# Patient Record
Sex: FEMALE | Race: WHITE | NOT HISPANIC OR LATINO | URBAN - METROPOLITAN AREA
[De-identification: names, ages, dates, MRNs, and addresses within clinical notes are randomized per-mention and may not be internally consistent; named-entity substitution may affect disease eponyms.]

---

## 2020-06-24 ENCOUNTER — OUT OF OFFICE VISIT (OUTPATIENT)
Dept: URBAN - METROPOLITAN AREA MEDICAL CENTER 1 | Facility: MEDICAL CENTER | Age: 60
End: 2020-06-24
Payer: MEDICARE

## 2020-06-24 DIAGNOSIS — K92.1 BLACK STOOL: ICD-10-CM

## 2020-06-24 DIAGNOSIS — U07.1 COVID-19: ICD-10-CM

## 2020-06-24 DIAGNOSIS — K74.69 CIRRHOSIS, CRYPTOGENIC: ICD-10-CM

## 2020-06-24 DIAGNOSIS — R93.2 ABNORMAL CHOLANGIOGRAM: ICD-10-CM

## 2020-06-24 DIAGNOSIS — D62 ACUTE BLOOD LOSS ANEMIA: ICD-10-CM

## 2020-06-24 PROCEDURE — 99231 SBSQ HOSP IP/OBS SF/LOW 25: CPT | Performed by: NURSE PRACTITIONER

## 2020-06-24 PROCEDURE — G8427 DOCREV CUR MEDS BY ELIG CLIN: HCPCS | Performed by: NURSE PRACTITIONER

## 2020-06-24 PROCEDURE — 99221 1ST HOSP IP/OBS SF/LOW 40: CPT | Performed by: NURSE PRACTITIONER

## 2021-02-18 ENCOUNTER — OUT OF OFFICE VISIT (OUTPATIENT)
Dept: URBAN - METROPOLITAN AREA MEDICAL CENTER 1 | Facility: MEDICAL CENTER | Age: 61
End: 2021-02-18
Payer: MEDICARE

## 2021-02-18 DIAGNOSIS — K74.60 ADVANCED CIRRHOSIS OF LIVER: ICD-10-CM

## 2021-02-18 DIAGNOSIS — D64.89 ANEMIA DUE TO OTHER CAUSE: ICD-10-CM

## 2021-02-18 DIAGNOSIS — K92.1 BLACK STOOL: ICD-10-CM

## 2021-02-18 DIAGNOSIS — N18.9 CKD (CHRONIC KIDNEY DISEASE): ICD-10-CM

## 2021-02-18 PROCEDURE — 99222 1ST HOSP IP/OBS MODERATE 55: CPT | Performed by: INTERNAL MEDICINE

## 2021-02-18 PROCEDURE — G8427 DOCREV CUR MEDS BY ELIG CLIN: HCPCS | Performed by: INTERNAL MEDICINE

## 2021-02-19 ENCOUNTER — OUT OF OFFICE VISIT (OUTPATIENT)
Dept: URBAN - METROPOLITAN AREA MEDICAL CENTER 1 | Facility: MEDICAL CENTER | Age: 61
End: 2021-02-19
Payer: MEDICARE

## 2021-02-19 DIAGNOSIS — K92.1 BLACK STOOL: ICD-10-CM

## 2021-02-19 DIAGNOSIS — K31.89 ACQUIRED DEFORMITY OF DUODENUM: ICD-10-CM

## 2021-02-19 DIAGNOSIS — K29.60 ADENOPAPILLOMATOSIS GASTRICA: ICD-10-CM

## 2021-02-19 PROCEDURE — 43239 EGD BIOPSY SINGLE/MULTIPLE: CPT | Performed by: INTERNAL MEDICINE

## 2021-02-20 ENCOUNTER — OUT OF OFFICE VISIT (OUTPATIENT)
Dept: URBAN - METROPOLITAN AREA MEDICAL CENTER 1 | Facility: MEDICAL CENTER | Age: 61
End: 2021-02-20
Payer: MEDICARE

## 2021-02-20 DIAGNOSIS — D64.89 ANEMIA DUE TO OTHER CAUSE: ICD-10-CM

## 2021-02-20 DIAGNOSIS — N18.9 ANEMIA DUE TO CHRONIC KIDNEY DISEASE: ICD-10-CM

## 2021-02-20 DIAGNOSIS — K92.1 BLACK STOOL: ICD-10-CM

## 2021-02-20 DIAGNOSIS — K74.60 ADVANCED CIRRHOSIS OF LIVER: ICD-10-CM

## 2021-02-20 PROCEDURE — 99232 SBSQ HOSP IP/OBS MODERATE 35: CPT | Performed by: INTERNAL MEDICINE

## 2021-03-01 ENCOUNTER — OUT OF OFFICE VISIT (OUTPATIENT)
Dept: URBAN - METROPOLITAN AREA MEDICAL CENTER 1 | Facility: MEDICAL CENTER | Age: 61
End: 2021-03-01
Payer: MEDICARE

## 2021-03-01 DIAGNOSIS — R10.84 ABDOMINAL CRAMPING, GENERALIZED: ICD-10-CM

## 2021-03-01 DIAGNOSIS — K74.60 ADVANCED CIRRHOSIS OF LIVER: ICD-10-CM

## 2021-03-01 DIAGNOSIS — R18.8 ABDOMINAL FLUID COLLECTION: ICD-10-CM

## 2021-03-01 DIAGNOSIS — K72.90 ATROPHY OF LIVER: ICD-10-CM

## 2021-03-01 PROCEDURE — 99222 1ST HOSP IP/OBS MODERATE 55: CPT | Performed by: INTERNAL MEDICINE

## 2021-03-01 PROCEDURE — G8427 DOCREV CUR MEDS BY ELIG CLIN: HCPCS | Performed by: INTERNAL MEDICINE

## 2021-03-17 ENCOUNTER — OUT OF OFFICE VISIT (OUTPATIENT)
Dept: URBAN - METROPOLITAN AREA MEDICAL CENTER 1 | Facility: MEDICAL CENTER | Age: 61
End: 2021-03-17
Payer: MEDICARE

## 2021-03-17 DIAGNOSIS — K92.1 BLACK STOOL: ICD-10-CM

## 2021-03-17 DIAGNOSIS — K72.90 ATROPHY OF LIVER: ICD-10-CM

## 2021-03-17 DIAGNOSIS — K74.60 ADVANCED CIRRHOSIS OF LIVER: ICD-10-CM

## 2021-03-17 DIAGNOSIS — K92.0 BLOODY EMESIS: ICD-10-CM

## 2021-03-17 DIAGNOSIS — R18.8 ABDOMINAL FLUID COLLECTION: ICD-10-CM

## 2021-03-17 PROCEDURE — 99222 1ST HOSP IP/OBS MODERATE 55: CPT | Performed by: INTERNAL MEDICINE

## 2021-03-17 PROCEDURE — G8427 DOCREV CUR MEDS BY ELIG CLIN: HCPCS | Performed by: INTERNAL MEDICINE

## 2021-03-17 PROCEDURE — 99232 SBSQ HOSP IP/OBS MODERATE 35: CPT | Performed by: INTERNAL MEDICINE

## 2021-03-19 ENCOUNTER — OUT OF OFFICE VISIT (OUTPATIENT)
Dept: URBAN - METROPOLITAN AREA MEDICAL CENTER 1 | Facility: MEDICAL CENTER | Age: 61
End: 2021-03-19
Payer: MEDICARE

## 2021-03-19 DIAGNOSIS — K72.90 ATROPHY OF LIVER: ICD-10-CM

## 2021-03-19 DIAGNOSIS — K74.60 ADVANCED CIRRHOSIS OF LIVER: ICD-10-CM

## 2021-03-19 PROCEDURE — 99232 SBSQ HOSP IP/OBS MODERATE 35: CPT | Performed by: INTERNAL MEDICINE

## 2021-03-23 ENCOUNTER — CLAIMS CREATED FROM THE CLAIM WINDOW (OUTPATIENT)
Dept: URBAN - NONMETROPOLITAN AREA CLINIC 2 | Facility: CLINIC | Age: 61
End: 2021-03-23
Payer: MEDICARE

## 2021-03-23 DIAGNOSIS — K72.90 HEPATIC ENCEPHALOPATHY: ICD-10-CM

## 2021-03-23 DIAGNOSIS — I85.10 SECONDARY ESOPHAGEAL VARICES WITHOUT BLEEDING: ICD-10-CM

## 2021-03-23 DIAGNOSIS — K75.81 NONALCOHOLIC STEATOHEPATITIS (NASH): ICD-10-CM

## 2021-03-23 DIAGNOSIS — R18.8 OTHER ASCITES: ICD-10-CM

## 2021-03-23 DIAGNOSIS — Z12.11 COLON CANCER SCREENING: ICD-10-CM

## 2021-03-23 DIAGNOSIS — D50.0 IRON DEFICIENCY ANEMIA DUE TO CHRONIC BLOOD LOSS: ICD-10-CM

## 2021-03-23 PROCEDURE — 99215 OFFICE O/P EST HI 40 MIN: CPT | Performed by: NURSE PRACTITIONER

## 2021-03-23 PROCEDURE — 99214 OFFICE O/P EST MOD 30 MIN: CPT | Performed by: NURSE PRACTITIONER

## 2021-03-23 RX ORDER — RIFAXIMIN 550 MG/1
1 TABLET TABLET ORAL TWICE A DAY
Qty: 180 TABLET | Refills: 2 | OUTPATIENT
Start: 2021-03-23 | End: 2021-12-17

## 2021-03-23 RX ORDER — OMEPRAZOLE 20 MG/1
CAPSULE, DELAYED RELEASE ORAL
Qty: 90 UNSPECIFIED | Status: ACTIVE | COMMUNITY

## 2021-03-23 RX ORDER — POTASSIUM CHLORIDE 750 MG/1
TABLET, EXTENDED RELEASE ORAL
Qty: 180 UNSPECIFIED | Status: ACTIVE | COMMUNITY

## 2021-03-23 RX ORDER — PREDNISONE 20 MG/1
TABLET ORAL
Qty: 10 UNSPECIFIED | Status: ACTIVE | COMMUNITY

## 2021-03-23 RX ORDER — METOLAZONE 5 MG/1
TABLET ORAL
Qty: 5 UNSPECIFIED | Status: ACTIVE | COMMUNITY

## 2021-03-23 RX ORDER — ALLOPURINOL 100 MG/1
TABLET ORAL
Qty: 30 UNSPECIFIED | Status: ACTIVE | COMMUNITY

## 2021-03-23 RX ORDER — LACTULOSE 10 G/15ML
SOLUTION ORAL
Qty: 1800 UNSPECIFIED | Status: ACTIVE | COMMUNITY

## 2021-03-23 RX ORDER — TRIAMCINOLONE ACETONIDE 1 MG/G
OINTMENT TOPICAL
Qty: 454 UNSPECIFIED | Status: ACTIVE | COMMUNITY

## 2021-03-23 RX ORDER — SERTRALINE HYDROCHLORIDE 50 MG/1
TABLET, FILM COATED ORAL
Qty: 90 UNSPECIFIED | Status: ACTIVE | COMMUNITY

## 2021-03-23 RX ORDER — METFORMIN HYDROCHLORIDE 500 MG/1
TABLET ORAL
Qty: 180 UNSPECIFIED | Status: ACTIVE | COMMUNITY

## 2021-03-23 RX ORDER — GABAPENTIN 100 MG/1
CAPSULE ORAL
Qty: 120 UNSPECIFIED | Status: ACTIVE | COMMUNITY

## 2021-03-23 RX ORDER — SPIRONOLACTONE 25 MG/1
TABLET ORAL
Qty: 180 UNSPECIFIED | Status: ACTIVE | COMMUNITY

## 2021-03-23 RX ORDER — AMLODIPINE BESYLATE 10 MG/1
TABLET ORAL
Qty: 90 UNSPECIFIED | Status: ACTIVE | COMMUNITY

## 2021-03-23 RX ORDER — OMEPRAZOLE 20 MG/1
1 CAPSULE 30 MINUTES BEFORE MORNING MEAL CAPSULE, DELAYED RELEASE ORAL BID
Qty: 180 CAPSULE | Refills: 3 | OUTPATIENT
Start: 2021-03-23

## 2021-03-23 RX ORDER — SUCRALFATE 1 G/1
TABLET ORAL
Qty: 90 UNSPECIFIED | Status: ACTIVE | COMMUNITY

## 2021-03-23 RX ORDER — TORSEMIDE 20 MG/1
TABLET ORAL
Qty: 360 UNSPECIFIED | Status: ACTIVE | COMMUNITY

## 2021-03-23 NOTE — HPI-TODAY'S VISIT:
Ashlyn presents for hospital follow-up of Campos cirrhosis with sequela of hepatic encephalopathy, portal hypertension, esophageal varices, and ascites.  Since her discharge yesterday she is feeling much better.  She has not gotten a prescription for Xifaxan, she is titrating her lactulose to 3-4 bowel movements daily.  She states that she remains on a low-sodium diet and is on torsemide 80 mg daily and spironolactone 100 mg daily following with Dr. Yoo.  She does see Dr. James, but she missed her appointment March 18 due to being admitted.  Her EGD earlier this year reveals oozing portal hypertensive gastropathy along with 3 columns of grade 1 esophageal varices.  Unfortunately her heart rate will not tolerate nonselective beta-blockers.  She is currently not on PPI therapy.  Today she is doing fairly well otherwise, she does plan to follow-up with Dr. James.  She states that she will need help in applying for patient assistance for the Xifaxan.  MB

## 2021-03-25 ENCOUNTER — TELEPHONE ENCOUNTER (OUTPATIENT)
Dept: URBAN - METROPOLITAN AREA CLINIC 23 | Facility: CLINIC | Age: 61
End: 2021-03-25

## 2021-04-01 ENCOUNTER — OFFICE VISIT (OUTPATIENT)
Dept: URBAN - NONMETROPOLITAN AREA CLINIC 2 | Facility: CLINIC | Age: 61
End: 2021-04-01

## 2021-04-29 ENCOUNTER — OUT OF OFFICE VISIT (OUTPATIENT)
Dept: URBAN - METROPOLITAN AREA MEDICAL CENTER 1 | Facility: MEDICAL CENTER | Age: 61
End: 2021-04-29
Payer: MEDICARE

## 2021-04-29 DIAGNOSIS — K74.69 CIRRHOSIS, CRYPTOGENIC: ICD-10-CM

## 2021-04-29 DIAGNOSIS — R18.8 ABDOMINAL FLUID COLLECTION: ICD-10-CM

## 2021-04-29 DIAGNOSIS — K72.90 ATROPHY OF LIVER: ICD-10-CM

## 2021-04-29 PROCEDURE — G8427 DOCREV CUR MEDS BY ELIG CLIN: HCPCS | Performed by: INTERNAL MEDICINE

## 2021-04-29 PROCEDURE — 99232 SBSQ HOSP IP/OBS MODERATE 35: CPT | Performed by: INTERNAL MEDICINE

## 2021-04-29 PROCEDURE — 99222 1ST HOSP IP/OBS MODERATE 55: CPT | Performed by: INTERNAL MEDICINE

## 2021-06-17 ENCOUNTER — LAB OUTSIDE AN ENCOUNTER (OUTPATIENT)
Dept: URBAN - NONMETROPOLITAN AREA CLINIC 2 | Facility: CLINIC | Age: 61
End: 2021-06-17

## 2021-06-17 ENCOUNTER — OFFICE VISIT (OUTPATIENT)
Dept: URBAN - NONMETROPOLITAN AREA CLINIC 2 | Facility: CLINIC | Age: 61
End: 2021-06-17
Payer: MEDICARE

## 2021-06-17 ENCOUNTER — WEB ENCOUNTER (OUTPATIENT)
Dept: URBAN - NONMETROPOLITAN AREA CLINIC 2 | Facility: CLINIC | Age: 61
End: 2021-06-17

## 2021-06-17 DIAGNOSIS — K72.90 HEPATIC ENCEPHALOPATHY: ICD-10-CM

## 2021-06-17 DIAGNOSIS — K75.81 NONALCOHOLIC STEATOHEPATITIS (NASH): ICD-10-CM

## 2021-06-17 DIAGNOSIS — Z12.11 COLON CANCER SCREENING: ICD-10-CM

## 2021-06-17 DIAGNOSIS — R18.8 OTHER ASCITES: ICD-10-CM

## 2021-06-17 DIAGNOSIS — D50.0 IRON DEFICIENCY ANEMIA DUE TO CHRONIC BLOOD LOSS: ICD-10-CM

## 2021-06-17 DIAGNOSIS — I85.10 SECONDARY ESOPHAGEAL VARICES WITHOUT BLEEDING: ICD-10-CM

## 2021-06-17 PROCEDURE — 99214 OFFICE O/P EST MOD 30 MIN: CPT | Performed by: INTERNAL MEDICINE

## 2021-06-17 RX ORDER — TORSEMIDE 20 MG/1
TABLET ORAL
Qty: 360 UNSPECIFIED | Status: ACTIVE | COMMUNITY

## 2021-06-17 RX ORDER — METFORMIN HYDROCHLORIDE 500 MG/1
TABLET ORAL
Qty: 180 UNSPECIFIED | Status: ACTIVE | COMMUNITY

## 2021-06-17 RX ORDER — PREDNISONE 20 MG/1
TABLET ORAL
Qty: 10 UNSPECIFIED | Status: ACTIVE | COMMUNITY

## 2021-06-17 RX ORDER — TRIAMCINOLONE ACETONIDE 1 MG/G
OINTMENT TOPICAL
Qty: 454 UNSPECIFIED | Status: ACTIVE | COMMUNITY

## 2021-06-17 RX ORDER — AMLODIPINE BESYLATE 10 MG/1
TABLET ORAL
Qty: 90 UNSPECIFIED | Status: ACTIVE | COMMUNITY

## 2021-06-17 RX ORDER — ALLOPURINOL 100 MG/1
TABLET ORAL
Qty: 30 UNSPECIFIED | Status: ACTIVE | COMMUNITY

## 2021-06-17 RX ORDER — SERTRALINE HYDROCHLORIDE 50 MG/1
TABLET, FILM COATED ORAL
Qty: 90 UNSPECIFIED | Status: ACTIVE | COMMUNITY

## 2021-06-17 RX ORDER — METOLAZONE 5 MG/1
TABLET ORAL
Qty: 5 UNSPECIFIED | Status: ACTIVE | COMMUNITY

## 2021-06-17 RX ORDER — LACTULOSE 10 G/15ML
SOLUTION ORAL
Qty: 1800 UNSPECIFIED | Status: ACTIVE | COMMUNITY

## 2021-06-17 RX ORDER — GABAPENTIN 100 MG/1
CAPSULE ORAL
Qty: 120 UNSPECIFIED | Status: ACTIVE | COMMUNITY

## 2021-06-17 RX ORDER — POTASSIUM CHLORIDE 750 MG/1
TABLET, EXTENDED RELEASE ORAL
Qty: 180 UNSPECIFIED | Status: ACTIVE | COMMUNITY

## 2021-06-17 RX ORDER — SUCRALFATE 1 G/1
TABLET ORAL
Qty: 90 UNSPECIFIED | Status: ACTIVE | COMMUNITY

## 2021-06-17 RX ORDER — OMEPRAZOLE 20 MG/1
1 CAPSULE 30 MINUTES BEFORE MORNING MEAL CAPSULE, DELAYED RELEASE ORAL BID
Qty: 180 CAPSULE | Refills: 3 | Status: ACTIVE | COMMUNITY
Start: 2021-03-23

## 2021-06-17 RX ORDER — OMEPRAZOLE 20 MG/1
CAPSULE, DELAYED RELEASE ORAL
Qty: 90 UNSPECIFIED | Status: ACTIVE | COMMUNITY

## 2021-06-17 RX ORDER — RIFAXIMIN 550 MG/1
1 TABLET TABLET ORAL TWICE A DAY
Qty: 180 TABLET | Refills: 2 | Status: ACTIVE | COMMUNITY
Start: 2021-03-23 | End: 2021-12-17

## 2021-06-17 RX ORDER — POLYETHYLENE GLYCOL 3350, SODIUM SULFATE ANHYDROUS, SODIUM BICARBONATE, SODIUM CHLORIDE, POTASSIUM CHLORIDE 236; 22.74; 6.74; 5.86; 2.97 G/4L; G/4L; G/4L; G/4L; G/4L
AS DIRECTED POWDER, FOR SOLUTION ORAL ONCE
Qty: 1 GALLON | Refills: 0 | OUTPATIENT
Start: 2021-06-17 | End: 2021-06-18

## 2021-06-17 RX ORDER — SPIRONOLACTONE 25 MG/1
TABLET ORAL
Qty: 180 UNSPECIFIED | Status: ACTIVE | COMMUNITY

## 2021-06-17 NOTE — HPI-TODAY'S VISIT:
Glenis presents for hospital follow-up of Campos cirrhosis with sequela of hepatic encephalopathy, portal hypertension, esophageal varices, and ascites.  Since her discharge yesterday she is feeling much better.  She has not gotten a prescription for Xifaxan, she is titrating her lactulose to 3-4 bowel movements daily.  She states that she remains on a low-sodium diet and is on torsemide 80 mg daily and spironolactone 100 mg daily following with Dr. Yoo.  She does see Dr. James, but she missed her appointment March 18 due to being admitted.  Her EGD earlier this year reveals oozing portal hypertensive gastropathy along with 3 columns of grade 1 esophageal varices.  Unfortunately her heart rate will not tolerate nonselective beta-blockers.  She is currently not on PPI therapy.  Today she is doing fairly well otherwise, she does plan to follow-up with Dr. James.  She states that she will need help in applying for patient assistance for the Xifaxan.  MB  6/17/2021 Glenis presents for [facility] follow-up.  In early May she was admitted with altered mental status and sepsis secondary to MRSA.  Infectious disease was consulted and she was treated.  She is on Xifaxan and lactulose with management of her Hg.  Today she has her symptoms managed.  She continues to have mild reaccumulation, she is following with nephrology on diuretics, she does not feel distended or need of a paracentesis today.  Her primary issue today is her new onset iron deficiency anemia.  During her admission her hemoglobin dropped to 7.  She did have a positive Hemoccult.  Her last EGD was in February 2021 with 3 columns of grade 1 esophageal varices and oozing portal hypertension.  She is not on a PPI per her list.  She is also not on a beta-blocker, her heart rate today is 69, her blood pressure is in the 120s over 70s.  Her last colonoscopy was done by Dr. Madrigal in the past 5 years, we have not been able to get these records.  Today she is here for evaluation of iron deficiency anemia, she is not seeing hematology.  MB

## 2021-07-28 ENCOUNTER — OUT OF OFFICE VISIT (OUTPATIENT)
Dept: URBAN - METROPOLITAN AREA MEDICAL CENTER 1 | Facility: MEDICAL CENTER | Age: 61
End: 2021-07-28
Payer: MEDICARE

## 2021-07-28 DIAGNOSIS — K92.1 BLACK STOOL: ICD-10-CM

## 2021-07-28 DIAGNOSIS — K72.90 ATROPHY OF LIVER: ICD-10-CM

## 2021-07-28 DIAGNOSIS — D64.89 ANEMIA DUE TO OTHER CAUSE: ICD-10-CM

## 2021-07-28 DIAGNOSIS — K74.60 ADVANCED CIRRHOSIS OF LIVER: ICD-10-CM

## 2021-07-28 PROCEDURE — 99222 1ST HOSP IP/OBS MODERATE 55: CPT | Performed by: INTERNAL MEDICINE

## 2021-07-28 PROCEDURE — G8427 DOCREV CUR MEDS BY ELIG CLIN: HCPCS | Performed by: INTERNAL MEDICINE

## 2021-07-29 ENCOUNTER — OUT OF OFFICE VISIT (OUTPATIENT)
Dept: URBAN - METROPOLITAN AREA MEDICAL CENTER 1 | Facility: MEDICAL CENTER | Age: 61
End: 2021-07-29
Payer: MEDICARE

## 2021-07-29 ENCOUNTER — TELEPHONE ENCOUNTER (OUTPATIENT)
Dept: URBAN - METROPOLITAN AREA CLINIC 92 | Facility: CLINIC | Age: 61
End: 2021-07-29

## 2021-07-29 ENCOUNTER — TELEPHONE ENCOUNTER (OUTPATIENT)
Dept: URBAN - NONMETROPOLITAN AREA CLINIC 2 | Facility: CLINIC | Age: 61
End: 2021-07-29

## 2021-07-29 DIAGNOSIS — K92.1 BLACK STOOL: ICD-10-CM

## 2021-07-29 DIAGNOSIS — K31.89 ACQUIRED DEFORMITY OF DUODENUM: ICD-10-CM

## 2021-07-29 PROCEDURE — 43235 EGD DIAGNOSTIC BRUSH WASH: CPT | Performed by: INTERNAL MEDICINE

## 2021-08-05 ENCOUNTER — OFFICE VISIT (OUTPATIENT)
Dept: URBAN - METROPOLITAN AREA MEDICAL CENTER 1 | Facility: MEDICAL CENTER | Age: 61
End: 2021-08-05
Payer: MEDICARE

## 2021-08-05 DIAGNOSIS — Z12.11 COLON CANCER SCREENING: ICD-10-CM

## 2021-08-05 PROCEDURE — 992 NON-BILLABLE: Performed by: INTERNAL MEDICINE

## 2021-08-30 ENCOUNTER — ERX REFILL RESPONSE (OUTPATIENT)
Dept: URBAN - NONMETROPOLITAN AREA CLINIC 2 | Facility: CLINIC | Age: 61
End: 2021-08-30

## 2021-08-30 RX ORDER — RIFAXIMIN 550 MG/1
1 TABLET TABLET ORAL TWICE A DAY
Qty: 180 TABLET | Refills: 2 | OUTPATIENT

## 2021-08-30 RX ORDER — RIFAXIMIN 550 MG/1
1 TABLET TABLET ORAL TWICE A DAY
Qty: 180 TABLET | Refills: 4 | OUTPATIENT

## 2021-09-16 ENCOUNTER — OFFICE VISIT (OUTPATIENT)
Dept: URBAN - NONMETROPOLITAN AREA CLINIC 2 | Facility: CLINIC | Age: 61
End: 2021-09-16

## 2021-09-23 ENCOUNTER — OFFICE VISIT (OUTPATIENT)
Dept: URBAN - NONMETROPOLITAN AREA CLINIC 2 | Facility: CLINIC | Age: 61
End: 2021-09-23
Payer: MEDICARE

## 2021-09-23 ENCOUNTER — TELEPHONE ENCOUNTER (OUTPATIENT)
Dept: URBAN - NONMETROPOLITAN AREA CLINIC 2 | Facility: CLINIC | Age: 61
End: 2021-09-23

## 2021-09-23 ENCOUNTER — LAB OUTSIDE AN ENCOUNTER (OUTPATIENT)
Dept: URBAN - NONMETROPOLITAN AREA CLINIC 2 | Facility: CLINIC | Age: 61
End: 2021-09-23

## 2021-09-23 VITALS
BODY MASS INDEX: 39.84 KG/M2 | WEIGHT: 269 LBS | HEIGHT: 69 IN | SYSTOLIC BLOOD PRESSURE: 125 MMHG | DIASTOLIC BLOOD PRESSURE: 74 MMHG | TEMPERATURE: 97.5 F | HEART RATE: 53 BPM

## 2021-09-23 DIAGNOSIS — K72.90 HEPATIC ENCEPHALOPATHY: ICD-10-CM

## 2021-09-23 DIAGNOSIS — R18.8 OTHER ASCITES: ICD-10-CM

## 2021-09-23 DIAGNOSIS — I85.10 SECONDARY ESOPHAGEAL VARICES WITHOUT BLEEDING: ICD-10-CM

## 2021-09-23 DIAGNOSIS — K75.81 NONALCOHOLIC STEATOHEPATITIS (NASH): ICD-10-CM

## 2021-09-23 DIAGNOSIS — Z12.11 COLON CANCER SCREENING: ICD-10-CM

## 2021-09-23 DIAGNOSIS — D50.0 IRON DEFICIENCY ANEMIA DUE TO CHRONIC BLOOD LOSS: ICD-10-CM

## 2021-09-23 PROCEDURE — 99214 OFFICE O/P EST MOD 30 MIN: CPT | Performed by: NURSE PRACTITIONER

## 2021-09-23 RX ORDER — LACTULOSE 10 G/15ML
SOLUTION ORAL
Qty: 1800 UNSPECIFIED | Status: ACTIVE | COMMUNITY

## 2021-09-23 RX ORDER — PREDNISONE 20 MG/1
TABLET ORAL
Qty: 10 UNSPECIFIED | Status: ACTIVE | COMMUNITY

## 2021-09-23 RX ORDER — SPIRONOLACTONE 25 MG/1
TABLET ORAL
Qty: 180 UNSPECIFIED | Status: ACTIVE | COMMUNITY

## 2021-09-23 RX ORDER — OMEPRAZOLE 20 MG/1
1 CAPSULE 30 MINUTES BEFORE MORNING MEAL CAPSULE, DELAYED RELEASE ORAL BID
Qty: 180 CAPSULE | Refills: 3 | Status: ACTIVE | COMMUNITY
Start: 2021-03-23

## 2021-09-23 RX ORDER — RIFAXIMIN 550 MG/1
1 TABLET TABLET ORAL TWICE A DAY
Qty: 180 TABLET | Refills: 4 | Status: ACTIVE | COMMUNITY

## 2021-09-23 RX ORDER — OMEPRAZOLE 20 MG/1
CAPSULE, DELAYED RELEASE ORAL
Qty: 90 UNSPECIFIED | Status: ACTIVE | COMMUNITY

## 2021-09-23 RX ORDER — TORSEMIDE 20 MG/1
TABLET ORAL
Qty: 360 UNSPECIFIED | Status: ACTIVE | COMMUNITY

## 2021-09-23 RX ORDER — AMLODIPINE BESYLATE 10 MG/1
TABLET ORAL
Qty: 90 UNSPECIFIED | Status: ACTIVE | COMMUNITY

## 2021-09-23 RX ORDER — ALLOPURINOL 100 MG/1
TABLET ORAL
Qty: 30 UNSPECIFIED | Status: ACTIVE | COMMUNITY

## 2021-09-23 RX ORDER — POTASSIUM CHLORIDE 750 MG/1
TABLET, EXTENDED RELEASE ORAL
Qty: 180 UNSPECIFIED | Status: ACTIVE | COMMUNITY

## 2021-09-23 RX ORDER — SERTRALINE HYDROCHLORIDE 50 MG/1
TABLET, FILM COATED ORAL
Qty: 90 UNSPECIFIED | Status: ACTIVE | COMMUNITY

## 2021-09-23 RX ORDER — GABAPENTIN 100 MG/1
CAPSULE ORAL
Qty: 120 UNSPECIFIED | Status: ACTIVE | COMMUNITY

## 2021-09-23 RX ORDER — METFORMIN HYDROCHLORIDE 500 MG/1
TABLET ORAL
Qty: 180 UNSPECIFIED | Status: ACTIVE | COMMUNITY

## 2021-09-23 RX ORDER — LACTULOSE 10 G/15ML
15 ML SOLUTION ORAL TID
Qty: 1350 ML | Refills: 11

## 2021-09-23 RX ORDER — TRIAMCINOLONE ACETONIDE 1 MG/G
OINTMENT TOPICAL
Qty: 454 UNSPECIFIED | Status: ACTIVE | COMMUNITY

## 2021-09-23 RX ORDER — SUCRALFATE 1 G/1
TABLET ORAL
Qty: 90 UNSPECIFIED | Status: ACTIVE | COMMUNITY

## 2021-09-23 RX ORDER — METOLAZONE 5 MG/1
TABLET ORAL
Qty: 5 UNSPECIFIED | Status: ACTIVE | COMMUNITY

## 2021-09-24 LAB
A/G RATIO: 1.1
AFP, SERUM, TUMOR MARKER: 2
ALBUMIN: 3.7
ALKALINE PHOSPHATASE: 130
ALT (SGPT): 28
AST (SGOT): 49
BASO (ABSOLUTE): 0
BASOS: 1
BILIRUBIN, TOTAL: 0.5
BUN/CREATININE RATIO: 27
BUN: 54
CALCIUM: 8.8
CARBON DIOXIDE, TOTAL: 21
CHLORIDE: 103
CREATININE: 2.01
EGFR IF AFRICN AM: 30
EGFR IF NONAFRICN AM: 26
EOS (ABSOLUTE): 0.1
EOS: 2
GLOBULIN, TOTAL: 3.5
GLUCOSE: 97
HEMATOCRIT: 25.7
HEMATOLOGY COMMENTS:: (no result)
HEMOGLOBIN: 8.2
IMMATURE CELLS: (no result)
IMMATURE GRANS (ABS): 0
IMMATURE GRANULOCYTES: 1
INR: 1.1
LYMPHS (ABSOLUTE): 0.5
LYMPHS: 11
MCH: 29.5
MCHC: 31.9
MCV: 92
MONOCYTES(ABSOLUTE): 0.3
MONOCYTES: 7
NEUTROPHILS (ABSOLUTE): 3.5
NEUTROPHILS: 78
NRBC: (no result)
PLATELETS: 70
POTASSIUM: 4.2
PROTEIN, TOTAL: 7.2
PROTHROMBIN TIME: 11.6
RBC: 2.78
RDW: 13.6
SODIUM: 140
WBC: 4.4

## 2021-09-28 ENCOUNTER — TELEPHONE ENCOUNTER (OUTPATIENT)
Dept: URBAN - METROPOLITAN AREA CLINIC 92 | Facility: CLINIC | Age: 61
End: 2021-09-28

## 2021-09-29 ENCOUNTER — LAB OUTSIDE AN ENCOUNTER (OUTPATIENT)
Dept: URBAN - METROPOLITAN AREA CLINIC 92 | Facility: CLINIC | Age: 61
End: 2021-09-29

## 2021-11-03 ENCOUNTER — TELEPHONE ENCOUNTER (OUTPATIENT)
Dept: URBAN - METROPOLITAN AREA SURGERY CENTER 30 | Facility: SURGERY CENTER | Age: 61
End: 2021-11-03

## 2021-11-03 RX ORDER — LACTULOSE 10 G/15ML
45 ML SOLUTION ORAL TID
Qty: 4050 ML | Refills: 11

## 2021-11-04 ENCOUNTER — OFFICE VISIT (OUTPATIENT)
Dept: URBAN - METROPOLITAN AREA MEDICAL CENTER 1 | Facility: MEDICAL CENTER | Age: 61
End: 2021-11-04

## 2021-12-16 ENCOUNTER — OFFICE VISIT (OUTPATIENT)
Dept: URBAN - METROPOLITAN AREA MEDICAL CENTER 1 | Facility: MEDICAL CENTER | Age: 61
End: 2021-12-16
Payer: MEDICARE

## 2021-12-16 ENCOUNTER — OFFICE VISIT (OUTPATIENT)
Dept: URBAN - NONMETROPOLITAN AREA CLINIC 2 | Facility: CLINIC | Age: 61
End: 2021-12-16

## 2021-12-16 DIAGNOSIS — D50.9 ANEMIA: ICD-10-CM

## 2021-12-16 DIAGNOSIS — D12.2 ADENOMA OF ASCENDING COLON: ICD-10-CM

## 2021-12-16 PROCEDURE — 45385 COLONOSCOPY W/LESION REMOVAL: CPT | Performed by: INTERNAL MEDICINE

## 2021-12-30 ENCOUNTER — OUT OF OFFICE VISIT (OUTPATIENT)
Dept: URBAN - METROPOLITAN AREA MEDICAL CENTER 1 | Facility: MEDICAL CENTER | Age: 61
End: 2021-12-30
Payer: MEDICARE

## 2021-12-30 DIAGNOSIS — D64.89 ANEMIA DUE TO OTHER CAUSE: ICD-10-CM

## 2021-12-30 DIAGNOSIS — K74.69 CIRRHOSIS, CRYPTOGENIC: ICD-10-CM

## 2021-12-30 DIAGNOSIS — K92.1 ACUTE MELENA: ICD-10-CM

## 2021-12-30 PROCEDURE — G8427 DOCREV CUR MEDS BY ELIG CLIN: HCPCS | Performed by: INTERNAL MEDICINE

## 2021-12-30 PROCEDURE — 99222 1ST HOSP IP/OBS MODERATE 55: CPT | Performed by: INTERNAL MEDICINE

## 2022-01-11 ENCOUNTER — OFFICE VISIT (OUTPATIENT)
Dept: URBAN - NONMETROPOLITAN AREA CLINIC 2 | Facility: CLINIC | Age: 62
End: 2022-01-11
Payer: MEDICARE

## 2022-01-11 ENCOUNTER — DASHBOARD ENCOUNTERS (OUTPATIENT)
Age: 62
End: 2022-01-11

## 2022-01-11 VITALS
DIASTOLIC BLOOD PRESSURE: 72 MMHG | HEART RATE: 60 BPM | BODY MASS INDEX: 40.29 KG/M2 | WEIGHT: 272 LBS | TEMPERATURE: 97.3 F | SYSTOLIC BLOOD PRESSURE: 115 MMHG | HEIGHT: 69 IN

## 2022-01-11 DIAGNOSIS — K75.81 NONALCOHOLIC STEATOHEPATITIS (NASH): ICD-10-CM

## 2022-01-11 DIAGNOSIS — R18.8 OTHER ASCITES: ICD-10-CM

## 2022-01-11 DIAGNOSIS — I85.10 SECONDARY ESOPHAGEAL VARICES WITHOUT BLEEDING: ICD-10-CM

## 2022-01-11 DIAGNOSIS — D50.0 IRON DEFICIENCY ANEMIA DUE TO CHRONIC BLOOD LOSS: ICD-10-CM

## 2022-01-11 PROBLEM — 14223005: Status: ACTIVE | Noted: 2021-03-23

## 2022-01-11 PROBLEM — 13920009: Status: ACTIVE | Noted: 2021-03-23

## 2022-01-11 PROBLEM — 266468003: Status: ACTIVE | Noted: 2021-03-23

## 2022-01-11 PROBLEM — 724556004: Status: ACTIVE | Noted: 2021-03-23

## 2022-01-11 PROBLEM — 389026000: Status: ACTIVE | Noted: 2021-03-23

## 2022-01-11 PROBLEM — 305058001: Status: ACTIVE | Noted: 2021-03-23

## 2022-01-11 PROCEDURE — 99214 OFFICE O/P EST MOD 30 MIN: CPT | Performed by: NURSE PRACTITIONER

## 2022-01-11 RX ORDER — TORSEMIDE 20 MG/1
TABLET ORAL
Qty: 360 UNSPECIFIED | Status: ACTIVE | COMMUNITY

## 2022-01-11 RX ORDER — OMEPRAZOLE 20 MG/1
CAPSULE, DELAYED RELEASE ORAL
Qty: 90 UNSPECIFIED | Status: ACTIVE | COMMUNITY

## 2022-01-11 RX ORDER — POTASSIUM CHLORIDE 750 MG/1
TABLET, EXTENDED RELEASE ORAL
Qty: 180 UNSPECIFIED | Status: ACTIVE | COMMUNITY

## 2022-01-11 RX ORDER — PREDNISONE 20 MG/1
TABLET ORAL
Qty: 10 UNSPECIFIED | Status: ACTIVE | COMMUNITY

## 2022-01-11 RX ORDER — METOLAZONE 5 MG/1
TABLET ORAL
Qty: 5 UNSPECIFIED | Status: ACTIVE | COMMUNITY

## 2022-01-11 RX ORDER — SERTRALINE HYDROCHLORIDE 50 MG/1
TABLET, FILM COATED ORAL
Qty: 90 UNSPECIFIED | Status: ACTIVE | COMMUNITY

## 2022-01-11 RX ORDER — OMEPRAZOLE 20 MG/1
1 CAPSULE 30 MINUTES BEFORE MORNING MEAL CAPSULE, DELAYED RELEASE ORAL BID
Qty: 180 CAPSULE | Refills: 3 | Status: ACTIVE | COMMUNITY
Start: 2021-03-23

## 2022-01-11 RX ORDER — AMLODIPINE BESYLATE 10 MG/1
TABLET ORAL
Qty: 90 UNSPECIFIED | Status: ACTIVE | COMMUNITY

## 2022-01-11 RX ORDER — ALLOPURINOL 100 MG/1
TABLET ORAL
Qty: 30 UNSPECIFIED | Status: ACTIVE | COMMUNITY

## 2022-01-11 RX ORDER — METFORMIN HYDROCHLORIDE 500 MG/1
TABLET ORAL
Qty: 180 UNSPECIFIED | Status: ACTIVE | COMMUNITY

## 2022-01-11 RX ORDER — SPIRONOLACTONE 25 MG/1
TABLET ORAL
Qty: 180 UNSPECIFIED | Status: ACTIVE | COMMUNITY

## 2022-01-11 RX ORDER — GABAPENTIN 100 MG/1
CAPSULE ORAL
Qty: 120 UNSPECIFIED | Status: ACTIVE | COMMUNITY

## 2022-01-11 RX ORDER — LACTULOSE 10 G/15ML
45 ML SOLUTION ORAL TID
Qty: 4050 ML | Refills: 11 | Status: ACTIVE | COMMUNITY

## 2022-01-11 RX ORDER — RIFAXIMIN 550 MG/1
1 TABLET TABLET ORAL TWICE A DAY
Qty: 180 TABLET | Refills: 4 | Status: ACTIVE | COMMUNITY

## 2022-01-11 RX ORDER — TRIAMCINOLONE ACETONIDE 1 MG/G
OINTMENT TOPICAL
Qty: 454 UNSPECIFIED | Status: ACTIVE | COMMUNITY

## 2022-01-11 RX ORDER — SUCRALFATE 1 G/1
TABLET ORAL
Qty: 90 UNSPECIFIED | Status: ACTIVE | COMMUNITY

## 2022-01-11 NOTE — HPI-TODAY'S VISIT:
Glenis presents for hospital follow-up of Campos cirrhosis with sequela of hepatic encephalopathy, portal hypertension, esophageal varices, and ascites.  Since her discharge yesterday she is feeling much better.  She has not gotten a prescription for Xifaxan, she is titrating her lactulose to 3-4 bowel movements daily.  She states that she remains on a low-sodium diet and is on torsemide 80 mg daily and spironolactone 100 mg daily following with Dr. Yoo.  She does see Dr. James, but she missed her appointment March 18 due to being admitted.  Her EGD earlier this year reveals oozing portal hypertensive gastropathy along with 3 columns of grade 1 esophageal varices.  Unfortunately her heart rate will not tolerate nonselective beta-blockers.  She is currently not on PPI therapy.  Today she is doing fairly well otherwise, she does plan to follow-up with Dr. James.  She states that she will need help in applying for patient assistance for the Xifaxan.  MB  6/17/2021 Glenis presents for [facility] follow-up.  In early May she was admitted with altered mental status and sepsis secondary to MRSA.  Infectious disease was consulted and she was treated.  She is on Xifaxan and lactulose with management of her Hg.  Today she has her symptoms managed.  She continues to have mild reaccumulation, she is following with nephrology on diuretics, she does not feel distended or need of a paracentesis today.  Her primary issue today is her new onset iron deficiency anemia.  During her admission her hemoglobin dropped to 7.  She did have a positive Hemoccult.  Her last EGD was in February 2021 with 3 columns of grade 1 esophageal varices and oozing portal hypertension.  She is not on a PPI per her list.  She is also not on a beta-blocker, her heart rate today is 69, her blood pressure is in the 120s over 70s.  Her last colonoscopy was done by Dr. Madrigal in the past 5 years, we have not been able to get these records.  Today she is here for evaluation of iron deficiency anemia, she is not seeing hematology.  MB  9/23/2021 Glenis presents for follow-up of end-stage liver disease secondary to nonalcoholic steatohepatitis.  Since her last visit her EGD reveals portal hypertensive gastropathy and grade 1 varices.  Her heart rate remains around the 50s and she is not a candidate for nonselective beta-blocker.  She was recently admitted in August with a flare of encephalopathy.  She felt that this was due to not using enough lactulose, she is now taking this 3 times daily only with improvement in her mentation.  She is compliant with his Afaxin twice daily.  She did miss her last appointment with Dr. James and does plan discussed schedule a follow-up.  She agrees to have repeat labs today to calculate her meld along with HCC screening with ultrasound.  She is likely due for repeat colonoscopy but would like to hold off on this for now, she has had multiple deaths in her family.  We will recheck her labs and consider if she is significantly iron deficient.  MB  1/11/2022 Glenis presents for [facility] follow-up.  She has a history of end-stage liver disease secondary to fatty liver with sequela of portal hypertension, esophageal varices, ascites, and hepatic encephalopathy.  She does follow with Dr. Neil James at [facility].  Since her last visit her repeat blood work shows increased anemia.  She had a colonoscopy with 1 tubular adenoma and diverticular disease.  She was subsequently admitted in late December with melena and symptomatic anemia with a hemoglobin of 6.4.  She had a repeat EGD Dr. Ray with mid esophageal varices grade 2 with no active bleeding.  She was placed on octreotide and no banding was performed.  She had a repeat endoscopy in 2 days later by Dr. Camden Ibarra with a gastric polyp that was hyperplastic no signs or symptoms of acute GI bleeding.  She received 2 units of packed red blood cells and her hemoglobin was above 7 on discharge.  She is having 3-4 loose bowel movements daily on lactulose but no further dark stools.  She cannot tolerate oral iron.  She has been evaluated by Dr. Norwood in the past with [facility] for IV iron.  She does have a follow-up this Thursday with Dr. James with hepatology.  She is compliant with her Xifaxan twice daily and has had no flares of hepatic encephalopathy.  She did have worsening renal function during her admitting like bleed due to to her GI bleed.  She is now back on torsemide 80 mg daily and spironolactone 50 mg daily.  She does have midodrine written as needed but is not taking this regularly.  She does have a follow-up with Dr. Yoo her nephrologist later this month she believes.  She has no significant abdominal pain or accumulation.  Today she is doing fairly well.  MB

## 2022-01-18 ENCOUNTER — TELEPHONE ENCOUNTER (OUTPATIENT)
Dept: URBAN - NONMETROPOLITAN AREA CLINIC 2 | Facility: CLINIC | Age: 62
End: 2022-01-18

## 2022-02-08 ENCOUNTER — TELEPHONE ENCOUNTER (OUTPATIENT)
Dept: URBAN - NONMETROPOLITAN AREA CLINIC 2 | Facility: CLINIC | Age: 62
End: 2022-02-08

## 2022-02-09 ENCOUNTER — OUT OF OFFICE VISIT (OUTPATIENT)
Dept: URBAN - METROPOLITAN AREA MEDICAL CENTER 1 | Facility: MEDICAL CENTER | Age: 62
End: 2022-02-09
Payer: MEDICARE

## 2022-02-09 DIAGNOSIS — K74.69 CIRRHOSIS, CRYPTOGENIC: ICD-10-CM

## 2022-02-09 DIAGNOSIS — D68.9 BLEEDING DISORDER: ICD-10-CM

## 2022-02-09 DIAGNOSIS — D62 ABLA (ACUTE BLOOD LOSS ANEMIA): ICD-10-CM

## 2022-02-09 DIAGNOSIS — K92.1 ACUTE MELENA: ICD-10-CM

## 2022-02-09 PROCEDURE — 99222 1ST HOSP IP/OBS MODERATE 55: CPT | Performed by: NURSE PRACTITIONER

## 2022-02-09 PROCEDURE — G8427 DOCREV CUR MEDS BY ELIG CLIN: HCPCS | Performed by: NURSE PRACTITIONER

## 2022-02-09 PROCEDURE — 99232 SBSQ HOSP IP/OBS MODERATE 35: CPT | Performed by: NURSE PRACTITIONER

## 2022-05-10 ENCOUNTER — OFFICE VISIT (OUTPATIENT)
Dept: URBAN - NONMETROPOLITAN AREA CLINIC 2 | Facility: CLINIC | Age: 62
End: 2022-05-10

## 2022-05-10 ENCOUNTER — OUT OF OFFICE VISIT (OUTPATIENT)
Dept: URBAN - METROPOLITAN AREA MEDICAL CENTER 1 | Facility: MEDICAL CENTER | Age: 62
End: 2022-05-10
Payer: MEDICARE

## 2022-05-10 DIAGNOSIS — K92.1 ACUTE MELENA: ICD-10-CM

## 2022-05-10 DIAGNOSIS — R18.8 ABDOMINAL ASCITES: ICD-10-CM

## 2022-05-10 DIAGNOSIS — K74.69 CIRRHOSIS, CRYPTOGENIC: ICD-10-CM

## 2022-05-10 DIAGNOSIS — D64.89 ANEMIA DUE TO OTHER CAUSE: ICD-10-CM

## 2022-05-10 PROCEDURE — 99232 SBSQ HOSP IP/OBS MODERATE 35: CPT | Performed by: PHYSICIAN ASSISTANT

## 2022-05-10 PROCEDURE — G8427 DOCREV CUR MEDS BY ELIG CLIN: HCPCS | Performed by: PHYSICIAN ASSISTANT

## 2022-05-10 PROCEDURE — 99222 1ST HOSP IP/OBS MODERATE 55: CPT | Performed by: PHYSICIAN ASSISTANT

## 2022-05-13 ENCOUNTER — OUT OF OFFICE VISIT (OUTPATIENT)
Dept: URBAN - METROPOLITAN AREA MEDICAL CENTER 1 | Facility: MEDICAL CENTER | Age: 62
End: 2022-05-13
Payer: MEDICARE

## 2022-05-13 DIAGNOSIS — K92.1 ACUTE MELENA: ICD-10-CM

## 2022-05-13 DIAGNOSIS — I85.11 ESOPHAGEAL VARICES WITH BLEEDING IN DISEASES CLASSIFIED ELSEWHERE: ICD-10-CM

## 2022-05-13 PROCEDURE — 43244 EGD VARICES LIGATION: CPT | Performed by: INTERNAL MEDICINE

## 2022-05-14 ENCOUNTER — OUT OF OFFICE VISIT (OUTPATIENT)
Dept: URBAN - METROPOLITAN AREA MEDICAL CENTER 1 | Facility: MEDICAL CENTER | Age: 62
End: 2022-05-14
Payer: MEDICARE

## 2022-05-14 DIAGNOSIS — K74.69 CIRRHOSIS, CRYPTOGENIC: ICD-10-CM

## 2022-05-14 DIAGNOSIS — K92.1 ACUTE MELENA: ICD-10-CM

## 2022-05-14 DIAGNOSIS — K31.89 ACQUIRED DEFORMITY OF DUODENUM: ICD-10-CM

## 2022-05-14 DIAGNOSIS — I85.10 ESOPH VARICE OTHER DIS: ICD-10-CM

## 2022-05-14 PROCEDURE — 99233 SBSQ HOSP IP/OBS HIGH 50: CPT | Performed by: INTERNAL MEDICINE

## 2022-05-14 PROCEDURE — 99232 SBSQ HOSP IP/OBS MODERATE 35: CPT | Performed by: INTERNAL MEDICINE

## 2022-06-06 ENCOUNTER — OUT OF OFFICE VISIT (OUTPATIENT)
Dept: URBAN - METROPOLITAN AREA MEDICAL CENTER 1 | Facility: MEDICAL CENTER | Age: 62
End: 2022-06-06
Payer: MEDICARE

## 2022-06-06 DIAGNOSIS — N18.9 ACUTE RENAL FAILURE- 1980'S - WAS TREATED FOR A SHORT TIME WITH DIALYSIS- SHUNT IN RIGHT UPPER THIGH- NO LONGER FUNCTIONAL.: ICD-10-CM

## 2022-06-06 DIAGNOSIS — I85.10 ESOPH VARICE OTHER DIS: ICD-10-CM

## 2022-06-06 DIAGNOSIS — D50.9 ANEMIA: ICD-10-CM

## 2022-06-06 DIAGNOSIS — K74.69 CIRRHOSIS, CRYPTOGENIC: ICD-10-CM

## 2022-06-06 PROCEDURE — G8427 DOCREV CUR MEDS BY ELIG CLIN: HCPCS | Performed by: PHYSICIAN ASSISTANT

## 2022-06-06 PROCEDURE — 99222 1ST HOSP IP/OBS MODERATE 55: CPT | Performed by: PHYSICIAN ASSISTANT

## 2022-06-08 ENCOUNTER — OFFICE VISIT (OUTPATIENT)
Dept: URBAN - NONMETROPOLITAN AREA CLINIC 13 | Facility: CLINIC | Age: 62
End: 2022-06-08

## 2022-06-14 ENCOUNTER — OFFICE VISIT (OUTPATIENT)
Dept: URBAN - NONMETROPOLITAN AREA CLINIC 13 | Facility: CLINIC | Age: 62
End: 2022-06-14

## 2022-07-06 ENCOUNTER — OUT OF OFFICE VISIT (OUTPATIENT)
Dept: URBAN - METROPOLITAN AREA MEDICAL CENTER 1 | Facility: MEDICAL CENTER | Age: 62
End: 2022-07-06
Payer: MEDICARE

## 2022-07-06 DIAGNOSIS — K74.69 CIRRHOSIS, CRYPTOGENIC: ICD-10-CM

## 2022-07-06 DIAGNOSIS — K92.1 ACUTE MELENA: ICD-10-CM

## 2022-07-06 DIAGNOSIS — I85.10 ESOPH VARICE OTHER DIS: ICD-10-CM

## 2022-07-06 DIAGNOSIS — K72.90 ATROPHY OF LIVER: ICD-10-CM

## 2022-07-06 DIAGNOSIS — K62.5 ANAL BLEEDING: ICD-10-CM

## 2022-07-06 PROCEDURE — 99232 SBSQ HOSP IP/OBS MODERATE 35: CPT | Performed by: PHYSICIAN ASSISTANT

## 2022-07-06 PROCEDURE — G8427 DOCREV CUR MEDS BY ELIG CLIN: HCPCS | Performed by: PHYSICIAN ASSISTANT

## 2022-07-06 PROCEDURE — 99222 1ST HOSP IP/OBS MODERATE 55: CPT | Performed by: PHYSICIAN ASSISTANT

## 2022-07-09 ENCOUNTER — OUT OF OFFICE VISIT (OUTPATIENT)
Dept: URBAN - METROPOLITAN AREA MEDICAL CENTER 1 | Facility: MEDICAL CENTER | Age: 62
End: 2022-07-09
Payer: MEDICARE

## 2022-07-09 DIAGNOSIS — K62.5 ANAL BLEEDING: ICD-10-CM

## 2022-07-09 DIAGNOSIS — K74.69 CIRRHOSIS, CRYPTOGENIC: ICD-10-CM

## 2022-07-09 DIAGNOSIS — K92.0 BLOODY EMESIS: ICD-10-CM

## 2022-07-09 DIAGNOSIS — I85.10 ESOPH VARICE OTHER DIS: ICD-10-CM

## 2022-07-09 PROCEDURE — 99232 SBSQ HOSP IP/OBS MODERATE 35: CPT | Performed by: INTERNAL MEDICINE

## 2022-07-11 ENCOUNTER — OUT OF OFFICE VISIT (OUTPATIENT)
Dept: URBAN - METROPOLITAN AREA MEDICAL CENTER 1 | Facility: MEDICAL CENTER | Age: 62
End: 2022-07-11
Payer: MEDICARE

## 2022-07-11 DIAGNOSIS — K74.69 CIRRHOSIS, CRYPTOGENIC: ICD-10-CM

## 2022-07-11 DIAGNOSIS — K92.1 ACUTE MELENA: ICD-10-CM

## 2022-07-11 DIAGNOSIS — I85.10 ESOPH VARICE OTHER DIS: ICD-10-CM

## 2022-07-11 DIAGNOSIS — K92.0 BLOODY EMESIS: ICD-10-CM

## 2022-07-11 PROCEDURE — 99232 SBSQ HOSP IP/OBS MODERATE 35: CPT | Performed by: NURSE PRACTITIONER

## 2022-07-12 ENCOUNTER — OUT OF OFFICE VISIT (OUTPATIENT)
Dept: URBAN - METROPOLITAN AREA MEDICAL CENTER 1 | Facility: MEDICAL CENTER | Age: 62
End: 2022-07-12
Payer: MEDICARE

## 2022-07-12 DIAGNOSIS — N17.9 ACUTE INJURY OF KIDNEY: ICD-10-CM

## 2022-07-12 DIAGNOSIS — K92.0 BLOODY EMESIS: ICD-10-CM

## 2022-07-12 DIAGNOSIS — K92.1 ACUTE MELENA: ICD-10-CM

## 2022-07-12 DIAGNOSIS — K74.69 CIRRHOSIS, CRYPTOGENIC: ICD-10-CM

## 2022-07-12 PROCEDURE — 99232 SBSQ HOSP IP/OBS MODERATE 35: CPT | Performed by: PHYSICIAN ASSISTANT

## 2022-07-13 ENCOUNTER — OUT OF OFFICE VISIT (OUTPATIENT)
Dept: URBAN - METROPOLITAN AREA MEDICAL CENTER 1 | Facility: MEDICAL CENTER | Age: 62
End: 2022-07-13
Payer: MEDICARE

## 2022-07-13 DIAGNOSIS — R79.89 ABNORMAL BILIRUBIN TEST: ICD-10-CM

## 2022-07-13 DIAGNOSIS — K92.1 ACUTE MELENA: ICD-10-CM

## 2022-07-13 DIAGNOSIS — K74.69 CIRRHOSIS, CRYPTOGENIC: ICD-10-CM

## 2022-07-13 DIAGNOSIS — K92.0 BLOODY EMESIS: ICD-10-CM

## 2022-07-13 PROCEDURE — 99232 SBSQ HOSP IP/OBS MODERATE 35: CPT | Performed by: NURSE PRACTITIONER

## 2022-07-18 ENCOUNTER — ERX REFILL RESPONSE (OUTPATIENT)
Dept: URBAN - NONMETROPOLITAN AREA CLINIC 2 | Facility: CLINIC | Age: 62
End: 2022-07-18

## 2022-07-18 RX ORDER — RIFAXIMIN 550 MG/1
TAKE ONE TABLET BY MOUTH TWICE DAILY TABLET ORAL
Qty: 180 TABLET | Refills: 3 | OUTPATIENT

## 2022-07-18 RX ORDER — RIFAXIMIN 550 MG/1
1 TABLET TABLET ORAL TWICE A DAY
Qty: 180 TABLET | Refills: 4 | OUTPATIENT

## 2022-10-18 ENCOUNTER — OFFICE VISIT (OUTPATIENT)
Dept: URBAN - NONMETROPOLITAN AREA CLINIC 2 | Facility: CLINIC | Age: 62
End: 2022-10-18

## 2022-11-03 ENCOUNTER — OUT OF OFFICE VISIT (OUTPATIENT)
Dept: URBAN - METROPOLITAN AREA MEDICAL CENTER 1 | Facility: MEDICAL CENTER | Age: 62
End: 2022-11-03
Payer: MEDICARE

## 2022-11-03 DIAGNOSIS — K92.1 ACUTE MELENA: ICD-10-CM

## 2022-11-03 DIAGNOSIS — K74.69 CIRRHOSIS, CRYPTOGENIC: ICD-10-CM

## 2022-11-03 DIAGNOSIS — D62 ABLA (ACUTE BLOOD LOSS ANEMIA): ICD-10-CM

## 2022-11-03 DIAGNOSIS — K76.82 ENCEPHALOPATHY, HEPATIC: ICD-10-CM

## 2022-11-03 PROCEDURE — 99223 1ST HOSP IP/OBS HIGH 75: CPT

## 2022-11-03 PROCEDURE — G8427 DOCREV CUR MEDS BY ELIG CLIN: HCPCS

## 2022-11-07 ENCOUNTER — OUT OF OFFICE VISIT (OUTPATIENT)
Dept: URBAN - METROPOLITAN AREA MEDICAL CENTER 1 | Facility: MEDICAL CENTER | Age: 62
End: 2022-11-07
Payer: MEDICARE

## 2022-11-07 DIAGNOSIS — I85.10 ESOPH VARICE OTHER DIS: ICD-10-CM

## 2022-11-07 DIAGNOSIS — K76.6 CLINICALLY SIGNIFICANT PORTAL HYPERTENSION: ICD-10-CM

## 2022-11-07 DIAGNOSIS — K74.69 CIRRHOSIS, CRYPTOGENIC: ICD-10-CM

## 2022-11-07 PROCEDURE — 99232 SBSQ HOSP IP/OBS MODERATE 35: CPT

## 2023-01-30 ENCOUNTER — OFFICE VISIT (OUTPATIENT)
Dept: URBAN - NONMETROPOLITAN AREA CLINIC 2 | Facility: CLINIC | Age: 63
End: 2023-01-30

## 2023-01-30 RX ORDER — METOLAZONE 5 MG/1
TABLET ORAL
Qty: 5 UNSPECIFIED | Status: ACTIVE | COMMUNITY

## 2023-01-30 RX ORDER — OMEPRAZOLE 20 MG/1
1 CAPSULE 30 MINUTES BEFORE MORNING MEAL CAPSULE, DELAYED RELEASE ORAL BID
Qty: 180 CAPSULE | Refills: 3 | Status: ACTIVE | COMMUNITY
Start: 2021-03-23

## 2023-01-30 RX ORDER — SPIRONOLACTONE 25 MG/1
TABLET ORAL
Qty: 180 UNSPECIFIED | Status: ACTIVE | COMMUNITY

## 2023-01-30 RX ORDER — METFORMIN HYDROCHLORIDE 500 MG/1
TABLET ORAL
Qty: 180 UNSPECIFIED | Status: ACTIVE | COMMUNITY

## 2023-01-30 RX ORDER — TORSEMIDE 20 MG/1
TABLET ORAL
Qty: 360 UNSPECIFIED | Status: ACTIVE | COMMUNITY

## 2023-01-30 RX ORDER — PREDNISONE 20 MG/1
TABLET ORAL
Qty: 10 UNSPECIFIED | Status: ACTIVE | COMMUNITY

## 2023-01-30 RX ORDER — GABAPENTIN 100 MG/1
CAPSULE ORAL
Qty: 120 UNSPECIFIED | Status: ACTIVE | COMMUNITY

## 2023-01-30 RX ORDER — LACTULOSE 10 G/15ML
45 ML SOLUTION ORAL TID
Qty: 4050 ML | Refills: 11 | Status: ACTIVE | COMMUNITY

## 2023-01-30 RX ORDER — ALLOPURINOL 100 MG/1
TABLET ORAL
Qty: 30 UNSPECIFIED | Status: ACTIVE | COMMUNITY

## 2023-01-30 RX ORDER — OMEPRAZOLE 20 MG/1
CAPSULE, DELAYED RELEASE ORAL
Qty: 90 UNSPECIFIED | Status: ACTIVE | COMMUNITY

## 2023-01-30 RX ORDER — RIFAXIMIN 550 MG/1
TAKE ONE TABLET BY MOUTH TWICE DAILY TABLET ORAL
Qty: 180 TABLET | Refills: 3 | Status: ACTIVE | COMMUNITY

## 2023-01-30 RX ORDER — TRIAMCINOLONE ACETONIDE 1 MG/G
OINTMENT TOPICAL
Qty: 454 UNSPECIFIED | Status: ACTIVE | COMMUNITY

## 2023-01-30 RX ORDER — SERTRALINE HYDROCHLORIDE 50 MG/1
TABLET, FILM COATED ORAL
Qty: 90 UNSPECIFIED | Status: ACTIVE | COMMUNITY

## 2023-01-30 RX ORDER — SUCRALFATE 1 G/1
TABLET ORAL
Qty: 90 UNSPECIFIED | Status: ACTIVE | COMMUNITY

## 2023-01-30 RX ORDER — AMLODIPINE BESYLATE 10 MG/1
TABLET ORAL
Qty: 90 UNSPECIFIED | Status: ACTIVE | COMMUNITY

## 2023-01-30 RX ORDER — POTASSIUM CHLORIDE 750 MG/1
TABLET, EXTENDED RELEASE ORAL
Qty: 180 UNSPECIFIED | Status: ACTIVE | COMMUNITY

## 2023-03-10 ENCOUNTER — OFFICE VISIT (OUTPATIENT)
Dept: URBAN - NONMETROPOLITAN AREA CLINIC 2 | Facility: CLINIC | Age: 63
End: 2023-03-10

## 2023-04-24 ENCOUNTER — ERX REFILL RESPONSE (OUTPATIENT)
Dept: URBAN - NONMETROPOLITAN AREA CLINIC 2 | Facility: CLINIC | Age: 63
End: 2023-04-24

## 2023-04-24 RX ORDER — RIFAXIMIN 550 MG/1
TAKE ONE TABLET BY MOUTH TWICE DAILY TABLET ORAL
Qty: 180 TABLET | Refills: 3 | OUTPATIENT